# Patient Record
Sex: MALE | URBAN - METROPOLITAN AREA
[De-identification: names, ages, dates, MRNs, and addresses within clinical notes are randomized per-mention and may not be internally consistent; named-entity substitution may affect disease eponyms.]

---

## 2021-09-10 ENCOUNTER — OFFICE VISIT (OUTPATIENT)
Dept: URBAN - METROPOLITAN AREA CLINIC 65 | Facility: CLINIC | Age: 81
End: 2021-09-10
Payer: MEDICARE

## 2021-09-10 DIAGNOSIS — H25.12 AGE-RELATED NUCLEAR CATARACT, LEFT EYE: ICD-10-CM

## 2021-09-10 PROCEDURE — 92134 CPTRZ OPH DX IMG PST SGM RTA: CPT | Performed by: OPHTHALMOLOGY

## 2021-09-10 PROCEDURE — 92004 COMPRE OPH EXAM NEW PT 1/>: CPT | Performed by: OPHTHALMOLOGY

## 2021-09-10 ASSESSMENT — INTRAOCULAR PRESSURE
OS: 13
OD: 16

## 2021-09-10 NOTE — IMPRESSION/PLAN
Impression: Age-related nuclear cataract, left eye: H25.12. Plan: Patient notes glare. Exam demonstrates a moderate cataract which has slowly progressed. Discussed R/B/A of surgery vs observation. Recommend observation until DR is better controlled. Warning symptoms discussed.

## 2021-10-08 ENCOUNTER — OFFICE VISIT (OUTPATIENT)
Dept: URBAN - METROPOLITAN AREA CLINIC 65 | Facility: CLINIC | Age: 81
End: 2021-10-08
Payer: MEDICARE

## 2021-10-08 DIAGNOSIS — E11.3413 TYPE 2 DIAB WITH SEVERE NONP RTNOP WITH MACULAR EDEMA, BI: Primary | ICD-10-CM

## 2021-10-08 DIAGNOSIS — H40.9 GLAUCOMA: ICD-10-CM

## 2021-10-08 PROCEDURE — 92134 CPTRZ OPH DX IMG PST SGM RTA: CPT | Performed by: OPHTHALMOLOGY

## 2021-10-08 PROCEDURE — 92012 INTRM OPH EXAM EST PATIENT: CPT | Performed by: OPHTHALMOLOGY

## 2021-10-08 ASSESSMENT — INTRAOCULAR PRESSURE
OD: 14
OS: 17

## 2021-10-08 NOTE — IMPRESSION/PLAN
Impression: Type 2 diab with severe nonp rtnop with macular edema, bi: [id number].
- s/p avastin last 09/10/2021 Plan: Exam and OCT reveal severe NPDR OS>OD with improving but persistent DME OS>OD after Avastin. Recommend intravitreal Avastin injection OU today. R/B/A discussed and patient elects to proceed. Intravitreal Avastin injection was administered Avastin in clinic without complication.   

RTC 4 wks re-eval with OCT OU, poss Avastin

## 2021-11-19 ENCOUNTER — OFFICE VISIT (OUTPATIENT)
Dept: URBAN - METROPOLITAN AREA CLINIC 65 | Facility: CLINIC | Age: 81
End: 2021-11-19
Payer: MEDICARE

## 2021-11-19 PROCEDURE — 92134 CPTRZ OPH DX IMG PST SGM RTA: CPT | Performed by: OPHTHALMOLOGY

## 2021-11-19 PROCEDURE — 92014 COMPRE OPH EXAM EST PT 1/>: CPT | Performed by: OPHTHALMOLOGY

## 2021-11-19 ASSESSMENT — INTRAOCULAR PRESSURE
OD: 15
OS: 17

## 2021-11-19 NOTE — IMPRESSION/PLAN
Impression: Type 2 diab with severe nonp rtnop with macular edema, bi: [id number].
- s/p avastin last 10/08/21 Plan: Exam and OCT reveal severe NPDR OS>OD with improving but persistent DME OS>OD after Avastin. Recommend intravitreal Avastin injection OU today. R/B/A discussed and patient elects to proceed. Intravitreal Avastin injection was administered Avastin in clinic without complication.   

RTC 4 wks re-eval with OCT OU, poss Avastin

## 2021-11-19 NOTE — IMPRESSION/PLAN
Impression: Presence of intraocular lens: Z96.1. Plan: PC IOL in good position. + PCO.   Rec follow up with [facility]

## 2021-12-17 ENCOUNTER — OFFICE VISIT (OUTPATIENT)
Dept: URBAN - METROPOLITAN AREA CLINIC 65 | Facility: CLINIC | Age: 81
End: 2021-12-17
Payer: OTHER GOVERNMENT

## 2021-12-17 DIAGNOSIS — Z96.1 PRESENCE OF INTRAOCULAR LENS: ICD-10-CM

## 2021-12-17 PROCEDURE — 92012 INTRM OPH EXAM EST PATIENT: CPT | Performed by: OPHTHALMOLOGY

## 2021-12-17 PROCEDURE — 92134 CPTRZ OPH DX IMG PST SGM RTA: CPT | Performed by: OPHTHALMOLOGY

## 2021-12-17 ASSESSMENT — INTRAOCULAR PRESSURE
OS: 14
OD: 16

## 2023-08-02 ENCOUNTER — OFFICE VISIT (OUTPATIENT)
Facility: LOCATION | Age: 83
End: 2023-08-02
Payer: MEDICARE

## 2023-08-02 DIAGNOSIS — Z96.1 PRESENCE OF INTRAOCULAR LENS: ICD-10-CM

## 2023-08-02 DIAGNOSIS — E11.3413 TYPE 2 DIABETES MELLITUS WITH SEVERE NONPROLIFERATIVE DIABETIC RETINOPATHY WITH MACULAR EDEMA, BILATERAL: Primary | ICD-10-CM

## 2023-08-02 DIAGNOSIS — H25.12 AGE-RELATED NUCLEAR CATARACT, LEFT EYE: ICD-10-CM

## 2023-08-02 DIAGNOSIS — H40.9 GLAUCOMA: ICD-10-CM

## 2023-08-02 PROCEDURE — 92134 CPTRZ OPH DX IMG PST SGM RTA: CPT | Performed by: OPHTHALMOLOGY

## 2023-08-02 PROCEDURE — 92014 COMPRE OPH EXAM EST PT 1/>: CPT | Performed by: OPHTHALMOLOGY

## 2023-08-02 ASSESSMENT — INTRAOCULAR PRESSURE
OS: 13
OD: 18

## 2023-09-27 ENCOUNTER — OFFICE VISIT (OUTPATIENT)
Facility: LOCATION | Age: 83
End: 2023-09-27
Payer: MEDICARE

## 2023-09-27 DIAGNOSIS — Z96.1 PRESENCE OF INTRAOCULAR LENS: ICD-10-CM

## 2023-09-27 DIAGNOSIS — H40.9 GLAUCOMA: ICD-10-CM

## 2023-09-27 DIAGNOSIS — H25.12 AGE-RELATED NUCLEAR CATARACT, LEFT EYE: ICD-10-CM

## 2023-09-27 DIAGNOSIS — E11.3413 TYPE 2 DIAB WITH SEVERE NONP RTNOP WITH MACULAR EDEMA, BI: Primary | ICD-10-CM

## 2023-09-27 PROCEDURE — 92134 CPTRZ OPH DX IMG PST SGM RTA: CPT | Performed by: OPHTHALMOLOGY

## 2023-09-27 PROCEDURE — 92014 COMPRE OPH EXAM EST PT 1/>: CPT | Performed by: OPHTHALMOLOGY

## 2023-09-27 ASSESSMENT — INTRAOCULAR PRESSURE
OS: 12
OD: 13